# Patient Record
Sex: MALE | Race: WHITE
[De-identification: names, ages, dates, MRNs, and addresses within clinical notes are randomized per-mention and may not be internally consistent; named-entity substitution may affect disease eponyms.]

---

## 2017-06-02 ENCOUNTER — HOSPITAL ENCOUNTER (OUTPATIENT)
Dept: HOSPITAL 75 - CARD | Age: 67
End: 2017-06-02
Attending: NURSE PRACTITIONER
Payer: MEDICARE

## 2017-06-02 DIAGNOSIS — I10: ICD-10-CM

## 2017-06-02 DIAGNOSIS — I25.10: Primary | ICD-10-CM

## 2017-06-02 DIAGNOSIS — E78.4: ICD-10-CM

## 2017-06-02 DIAGNOSIS — I08.0: ICD-10-CM

## 2017-06-02 DIAGNOSIS — I65.23: ICD-10-CM

## 2017-06-02 DIAGNOSIS — G47.33: ICD-10-CM

## 2017-06-02 PROCEDURE — 93306 TTE W/DOPPLER COMPLETE: CPT

## 2017-06-05 NOTE — ECHOCARDIOGRAPHY REPORT
DATE OF SERVICE:  06/02/2017



ECHOCARDIOGRAPHY REPORT



ORDERING PHYSICIAN:

VIOLA Smith



PRIMARY PHYSICIAN:

Dr. Corley.



OTHER PHYSICIAN:

Dr. Bauer.



CLINICAL DIAGNOSIS:

Coronary artery disease, hypertension.



MEASUREMENTS:

Aortic root 3.3.  LV diameter diastolic 5.5.  IVS thickness, diastolic 0.9. 

LVPW thickness, diastolic 0.9.  Left atrium 4.6.



DESCRIPTION:

Two dimensional echocardiography shows well-preserved global left ventricular

systolic function.  Left ventricular ejection fraction is approximately 60%. 

Aortic, mitral and tricuspid valve leaflets show good leaflet excursion.  There

is no significant pericardial effusion.  There is mild aortic valve sclerosis. 

The aortic valve leaflet structure is not very well visualized.  No distinct

regional wall motion abnormalities seen on this study.  Doppler imaging shows

trivial tricuspid regurgitation.  Pulmonary artery systolic pressure is

estimated at approximately 30 mmHg.  There is no Doppler evidence of significant

valvular stenosis.  There is mild to moderate mitral regurgitation.  There is no

evidence of any significant intracardiac shunt on this transthoracic

echocardiographic study.  Inferior vena cava appears to be of normal size and

appears mostly collapsed during the study.  There is no distinct evidence of

intracardiac shunt on this transthoracic echocardiographic study.



CONCLUSIONS:

1.  Well preserved global left ventricular systolic function with ejection

fraction approximately 60%.

2.  Mild to moderate enlargement of the left atrium.

3.  Trivial tricuspid regurgitation.

4.  Mild to moderate mitral regurgitation.

5.  Mild aortic valve sclerosis.

6.  No evidence of any significant valvular stenosis.

7.  Pulmonary systolic pressure is estimated at approximately 30 mmHg.





Job ID: [id number]

DocumentID: [id number]

Dictated Date:  06/04/2017 15:11:57

Transcription Date: 06/04/2017 19:28:20

Dictated By: HENRIK BAUER MD, MA, FACP, FACC,